# Patient Record
Sex: MALE | Race: WHITE
[De-identification: names, ages, dates, MRNs, and addresses within clinical notes are randomized per-mention and may not be internally consistent; named-entity substitution may affect disease eponyms.]

---

## 2020-10-08 ENCOUNTER — HOSPITAL ENCOUNTER (EMERGENCY)
Dept: HOSPITAL 92 - ERS | Age: 75
LOS: 1 days | Discharge: HOME | End: 2020-10-09
Payer: MEDICARE

## 2020-10-08 DIAGNOSIS — S51.012A: Primary | ICD-10-CM

## 2020-10-08 DIAGNOSIS — F17.220: ICD-10-CM
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
## 2020-10-08 PROCEDURE — 90715 TDAP VACCINE 7 YRS/> IM: CPT

## 2020-10-08 PROCEDURE — 12002 RPR S/N/AX/GEN/TRNK2.6-7.5CM: CPT

## 2020-10-08 PROCEDURE — 90471 IMMUNIZATION ADMIN: CPT

## 2020-10-09 NOTE — RAD
LEFT ELBOW 4 VIEWS:

 

Date:  10/08/2020

 

HISTORY:  

Not provided. 

 

FINDINGS:

There appears to be soft tissue injury. Small foreign bodies may be present. 

 

No joint effusion. No fractures. Preserved joint spaces. 

 

IMPRESSION: 

1.  Soft tissue laceration with foreign bodies. 

2.  No fracture. 

 

 

POS: OFF